# Patient Record
Sex: FEMALE | Employment: OTHER | URBAN - METROPOLITAN AREA
[De-identification: names, ages, dates, MRNs, and addresses within clinical notes are randomized per-mention and may not be internally consistent; named-entity substitution may affect disease eponyms.]

---

## 2024-07-03 ENCOUNTER — APPOINTMENT (OUTPATIENT)
Dept: PRIMARY CARE | Facility: CLINIC | Age: 74
End: 2024-07-03
Payer: MEDICARE

## 2024-07-03 VITALS
HEART RATE: 69 BPM | HEIGHT: 64 IN | TEMPERATURE: 97.3 F | RESPIRATION RATE: 16 BRPM | BODY MASS INDEX: 22.53 KG/M2 | WEIGHT: 132 LBS | SYSTOLIC BLOOD PRESSURE: 141 MMHG | DIASTOLIC BLOOD PRESSURE: 76 MMHG | OXYGEN SATURATION: 95 %

## 2024-07-03 DIAGNOSIS — D64.9 ANEMIA, UNSPECIFIED TYPE: ICD-10-CM

## 2024-07-03 DIAGNOSIS — E03.9 HYPOTHYROIDISM, UNSPECIFIED TYPE: ICD-10-CM

## 2024-07-03 DIAGNOSIS — E53.8 VITAMIN B12 DEFICIENCY: ICD-10-CM

## 2024-07-03 DIAGNOSIS — R41.82 MENTAL STATUS, DECREASED: ICD-10-CM

## 2024-07-03 DIAGNOSIS — Z00.00 ENCOUNTER FOR HEALTH MAINTENANCE EXAMINATION: Primary | ICD-10-CM

## 2024-07-03 DIAGNOSIS — E55.9 VITAMIN D DEFICIENCY: ICD-10-CM

## 2024-07-03 DIAGNOSIS — F03.C0 SEVERE DEMENTIA WITHOUT BEHAVIORAL DISTURBANCE, PSYCHOTIC DISTURBANCE, MOOD DISTURBANCE, OR ANXIETY, UNSPECIFIED DEMENTIA TYPE (MULTI): ICD-10-CM

## 2024-07-03 RX ORDER — LEVOTHYROXINE SODIUM 75 UG/1
75 TABLET ORAL DAILY
Qty: 30 TABLET | Refills: 11 | Status: SHIPPED | OUTPATIENT
Start: 2024-07-03 | End: 2025-07-03

## 2024-07-03 RX ORDER — LEVOTHYROXINE SODIUM 25 UG/1
25 TABLET ORAL
COMMUNITY
Start: 2024-06-14

## 2024-07-03 ASSESSMENT — ACTIVITIES OF DAILY LIVING (ADL)
MANAGING_FINANCES: TOTAL CARE
GROCERY_SHOPPING: NEEDS ASSISTANCE
TAKING_MEDICATION: NEEDS ASSISTANCE
BATHING: DEPENDENT
DOING_HOUSEWORK: NEEDS ASSISTANCE
DRESSING: DEPENDENT

## 2024-07-03 ASSESSMENT — ENCOUNTER SYMPTOMS
LOSS OF SENSATION IN FEET: 1
DEPRESSION: 0
OCCASIONAL FEELINGS OF UNSTEADINESS: 1

## 2024-07-03 ASSESSMENT — PATIENT HEALTH QUESTIONNAIRE - PHQ9
1. LITTLE INTEREST OR PLEASURE IN DOING THINGS: NOT AT ALL
SUM OF ALL RESPONSES TO PHQ9 QUESTIONS 1 AND 2: 0
2. FEELING DOWN, DEPRESSED OR HOPELESS: NOT AT ALL

## 2024-07-03 NOTE — PROGRESS NOTES
"Subjective   Reason for Visit: Kimmy Julian is an 74 y.o. female here for a Medicare Wellness visit.     Reviewed all medications by prescribing practitioner or clinical pharmacist (such as prescriptions, OTCs, herbal therapies and supplements) and documented in the medical record.    HPI  Patient comes in today with her granddaughter Selene who is now staying with her.  She was in the hospital recently at [facility] for change in mental status.  According to the granddaughter the only thing that they found in the hospital was her thyroid level was quite high at 78.93.  In review of her hospital results her CPK was also quite high at 1385 and she was slightly anemic with hemoglobin at 11.7.  Her UA was loaded with bacteria but no blood or nitrites.  Her blood culture came back negative.  Her CT scan of the brain showed no acute abnormality with moderate chronic small vessel ischemic changes notified in the cerebral white matter and along with mild to moderate atrophy.  She also has noted left-sided craniotomy changes.  The MRI of her brain was also unremarkable for any acute changes.    In the office here today mentally she is quite different than I have ever seen her.  She has not been in to see me in 4 years.  A Mini-Mental status exam was done today and she scored a 4 out of 30.  She is quite confused.  She believes she is in \"Dr. Yanez's office\".  Her granddaughter said she was quite excited about coming to see Dr. Walden and she appears to recognize me but does not know my name.  When I had been seeing her previously her thyroid was definitely a problem and we were never able to regulate it appropriately.  Now that her granddaughter lives with her hopefully this will change.  Granddaughter states she is with her all the time and that she does own her own nail salon and when she is at work her mother comes and stays with the patient.  Patient has been on [place] [facility] before.  We are going to maintain her on " levothyroxine this time for management.    1019/2020  LOGAN ASHTON presents with complaints of visit for: (follow up on her meds and order bw)   Patient comes in today with her son Stiven to follow-up on multiple health issues. She apparently is not taking her thyroid medicine again. She lives alone and suffers from some sort of dementia and according to her son he lives nearby and is trying to help her keep track of her medicines. She reports a lot of different symptoms such as weight gain, constipation, muscle aches, all consistent with being hypothyroid. She has gained 18 pounds since last November.    2/11/2020  Patient comes in today with her son will follow-up on her multiple health issues. Patient lives alone and is mildly confused which her son recognizes. She has not been taking her thyroid medicine as directed and was supposed to be here today for follow-up to see if the dose was correct. However the patient states she was told not to take anymore thyroid medication and thus she has not been taking it for the last 3 months. Interestingly she is supposed to be on 90 mg of [place] Thyroid daily and she brings in medication bottles that have 120 mg and 60 mg from years ago. The patient's son assures me that he will try to get her medications straightened out for her. Patient is markedly underweight but she has gained 7 pounds since she was here in November. Unfortunately I suspect that it is from her not taking her thyroid medicine.    11/22/19  Patient comes in today by herself for follow-up on her medications. She is not a good historian and having suffered a significant head injury back in March does not help her memory process. She brings with her urine today that she brought from home because she's been having some urinary symptoms. She has also lost 4 pounds since she was here in August and she is already quite thin.    She has a fungus under the distal right great toenail.    She has a cough and  "congestion. She is allergic to Cipro and penicillin.    8/28/19  Patient comes in today brought in by her son, for recheck and refill of medications. She has not been here since August 2016. She was in the hospital earlier this year after she fell and had a significant head injury with stitches to the left side of the scalp. Patient has significant hypothyroidism and was not taking her medications. She had been placed on her medications on discharge several months ago but does medicines only lasted a few weeks when she ran out. She comes in today currently on no medications. In the past she has always used [place] [facility] as her drug of choice for her thyroid. She would like to get back on that medication. She has lost a significant amount of weight and is currently 5 foot 4 inches, 110 pounds. Patient is basically a recluse and does not get out of her house much at all. She is showered and clean today, just extremely thin.    8/31/16  Patient comes in today for a checkup and refill of medicines. She has not been here since November 2014. She is basically housebound. She claims that she ran out of all of her medicines \"months ago\". She had been the sole caretaker of her 82-year-old  who had a stroke many years ago. He passed away in May 2015. Patient claims that she does not drive and so she has not been able to get in here for her own health care. She claims her son had to take off of work to bring her in here today. She has been feeling \"cold\", losing hair on her legs, her hair is falling out, she feels sluggish and she has had loss of weight.     Patient Care Team:  Chay Walden DO as PCP - General (Family Medicine)     Review of Systems   Psychiatric/Behavioral:  Positive for confusion.    All other systems reviewed and are negative.    Objective   Vitals:  /76   Pulse 69   Temp 36.3 °C (97.3 °F)   Resp 16   Ht 1.626 m (5' 4\")   Wt 59.9 kg (132 lb)   LMP  (LMP Unknown)   SpO2 95%   BMI " 22.66 kg/m²       Physical Exam  Vitals reviewed.   Constitutional:       Appearance: She is well-developed.      Comments: She is currently in a wheelchair but her granddaughter states she is able to get up and move about the house on her own.   HENT:      Head: Normocephalic and atraumatic.      Right Ear: Tympanic membrane, ear canal and external ear normal.      Left Ear: Tympanic membrane, ear canal and external ear normal.      Nose: Nose normal.      Mouth/Throat:      Mouth: Mucous membranes are moist.      Pharynx: Oropharynx is clear.   Eyes:      Extraocular Movements: Extraocular movements intact.      Conjunctiva/sclera: Conjunctivae normal.      Pupils: Pupils are equal, round, and reactive to light.   Cardiovascular:      Rate and Rhythm: Normal rate and regular rhythm.      Heart sounds: Normal heart sounds. No murmur heard.  Pulmonary:      Effort: Pulmonary effort is normal.      Breath sounds: Normal breath sounds. No wheezing.   Abdominal:      General: Abdomen is flat. Bowel sounds are normal.      Palpations: Abdomen is soft.   Musculoskeletal:         General: Normal range of motion.   Skin:     General: Skin is warm and dry.   Neurological:      General: No focal deficit present.      Mental Status: She is alert and oriented to person, place, and time. Mental status is at baseline.   Psychiatric:         Mood and Affect: Mood normal.         Behavior: Behavior normal.         Thought Content: Thought content normal.         Judgment: Judgment normal.         Assessment/Plan   Problem List Items Addressed This Visit       Hypothyroidism    Relevant Medications    levothyroxine (Synthroid, Levoxyl) 75 mcg tablet    Mental status, decreased    Anemia    Severe dementia without behavioral disturbance, psychotic disturbance, mood disturbance, or anxiety (Multi)    Vitamin B12 deficiency    Vitamin D deficiency     Other Visit Diagnoses       Encounter for health maintenance examination    -   Primary        Will investigate further the hospital medical records to see all that has been done.  Investigate past medical records.  Increase levothyroxine to 75 mcg daily.  Continue all current meds.  RTC in one month for recheck, sooner should problems arise.  Medication list reconciled.  Thank you for visiting today!      Professional services: Some of this note was completed using Dragon voice technology and sometimes the software misinterprets words. This may include unintended errors with respect to translation of words, typographical errors or grammar errors which may not have been identified prior to finalization of the chart note. Please take this into account when reading the note. Thank you.

## 2024-07-06 PROBLEM — D64.9 ANEMIA: Status: ACTIVE | Noted: 2024-07-06

## 2024-07-06 PROBLEM — E53.8 VITAMIN B12 DEFICIENCY: Status: ACTIVE | Noted: 2024-07-06

## 2024-07-06 PROBLEM — E03.9 HYPOTHYROIDISM: Status: ACTIVE | Noted: 2024-07-06

## 2024-07-06 PROBLEM — R41.82 MENTAL STATUS, DECREASED: Status: ACTIVE | Noted: 2024-07-06

## 2024-07-06 PROBLEM — F03.C0 SEVERE DEMENTIA WITHOUT BEHAVIORAL DISTURBANCE, PSYCHOTIC DISTURBANCE, MOOD DISTURBANCE, OR ANXIETY (MULTI): Status: ACTIVE | Noted: 2024-07-06

## 2024-07-06 PROBLEM — E55.9 VITAMIN D DEFICIENCY: Status: ACTIVE | Noted: 2024-07-06

## 2024-07-06 ASSESSMENT — ENCOUNTER SYMPTOMS: CONFUSION: 1

## 2024-08-14 ENCOUNTER — APPOINTMENT (OUTPATIENT)
Dept: PRIMARY CARE | Facility: CLINIC | Age: 74
End: 2024-08-14
Payer: MEDICARE

## 2024-08-14 VITALS
TEMPERATURE: 97.5 F | OXYGEN SATURATION: 96 % | DIASTOLIC BLOOD PRESSURE: 90 MMHG | BODY MASS INDEX: 23.34 KG/M2 | WEIGHT: 136 LBS | RESPIRATION RATE: 20 BRPM | HEART RATE: 72 BPM | SYSTOLIC BLOOD PRESSURE: 153 MMHG

## 2024-08-14 DIAGNOSIS — E03.8 OTHER SPECIFIED HYPOTHYROIDISM: ICD-10-CM

## 2024-08-14 DIAGNOSIS — E78.5 HYPERLIPIDEMIA, UNSPECIFIED HYPERLIPIDEMIA TYPE: ICD-10-CM

## 2024-08-14 DIAGNOSIS — E53.8 VITAMIN B12 DEFICIENCY: ICD-10-CM

## 2024-08-14 DIAGNOSIS — F02.80 ALZHEIMER DISEASE (MULTI): ICD-10-CM

## 2024-08-14 DIAGNOSIS — E55.9 VITAMIN D DEFICIENCY: ICD-10-CM

## 2024-08-14 DIAGNOSIS — B07.9 VERRUCAE VULGARIS: ICD-10-CM

## 2024-08-14 DIAGNOSIS — H61.23 BILATERAL HEARING LOSS DUE TO CERUMEN IMPACTION: ICD-10-CM

## 2024-08-14 DIAGNOSIS — F03.C0 SEVERE DEMENTIA WITHOUT BEHAVIORAL DISTURBANCE, PSYCHOTIC DISTURBANCE, MOOD DISTURBANCE, OR ANXIETY, UNSPECIFIED DEMENTIA TYPE (MULTI): Primary | ICD-10-CM

## 2024-08-14 DIAGNOSIS — D50.8 OTHER IRON DEFICIENCY ANEMIA: ICD-10-CM

## 2024-08-14 DIAGNOSIS — G30.9 ALZHEIMER DISEASE (MULTI): ICD-10-CM

## 2024-08-14 PROCEDURE — 69210 REMOVE IMPACTED EAR WAX UNI: CPT | Performed by: FAMILY MEDICINE

## 2024-08-14 PROCEDURE — 99214 OFFICE O/P EST MOD 30 MIN: CPT | Performed by: FAMILY MEDICINE

## 2024-08-14 NOTE — PROGRESS NOTES
Subjective   Patient ID: Kimmy Julian is a 74 y.o. female who presents for Follow-up (Fu for thyroid medication. ).  HPI  Patient returns today with her daughter-in-law for follow-up.  She is back on the Synthroid medication and is much more alert today but still quite confused.  According to her daughter-in-law she has been having problems hearing.    Review of Systems   All other systems reviewed and are negative.      Objective   Vitals:  /90   Pulse 72   Temp 36.4 °C (97.5 °F)   Resp 20   Wt 61.7 kg (136 lb)   LMP  (LMP Unknown)   SpO2 96%   BMI 23.34 kg/m²     Physical Exam  Vitals reviewed.   Constitutional:       Appearance: She is well-developed.   HENT:      Head: Normocephalic and atraumatic.      Right Ear: Tympanic membrane and external ear normal. There is impacted cerumen (Cerumen removed from right ear with irrigation and instrumentation.  Patient tolerated procedure well.  After the cerumen was removed the ear was rechecked and no evidence of infection was found.).      Left Ear: Tympanic membrane and external ear normal. There is impacted cerumen (Cerumen removed from left ear with irrigation and instrumentation.  Patient tolerated procedure well.  After the cerumen was removed the ear was rechecked and no evidence of infection was found.).      Nose: Nose normal.      Mouth/Throat:      Mouth: Mucous membranes are moist.      Pharynx: Oropharynx is clear.   Eyes:      Extraocular Movements: Extraocular movements intact.      Conjunctiva/sclera: Conjunctivae normal.      Pupils: Pupils are equal, round, and reactive to light.   Cardiovascular:      Rate and Rhythm: Normal rate and regular rhythm.      Heart sounds: Normal heart sounds. No murmur heard.  Pulmonary:      Effort: Pulmonary effort is normal.      Breath sounds: Normal breath sounds. No wheezing.   Abdominal:      General: Abdomen is flat. Bowel sounds are normal.      Palpations: Abdomen is soft.   Musculoskeletal:          General: Normal range of motion.   Skin:     General: Skin is warm and dry.   Neurological:      General: No focal deficit present.      Mental Status: She is alert and oriented to person, place, and time. Mental status is at baseline.   Psychiatric:      Comments: Patient is pleasantly confused       Assessment/Plan   Problem List Items Addressed This Visit       Hypothyroidism    Relevant Orders    TSH with reflex to Free T4 if abnormal    Anemia    Relevant Orders    CBC    Severe dementia without behavioral disturbance, psychotic disturbance, mood disturbance, or anxiety (Multi) - Primary    Relevant Orders    Comprehensive Metabolic Panel    Vitamin B12 deficiency    Relevant Orders    CBC    Vitamin B12    Vitamin D deficiency    Relevant Orders    Vitamin D 25-Hydroxy,Total (for eval of Vitamin D levels)     Other Visit Diagnoses       Late onset Alzheimer dementia, unspecified dementia severity, unspecified whether behavioral, psychotic, or mood disturbance or anxiety (Multi)        Alzheimer disease (Multi)        Verrucae vulgaris        Relevant Orders    Referral to Dermatology    Hyperlipidemia, unspecified hyperlipidemia type        Relevant Orders    Lipid Panel        Repeat lab work in September.  Continue current meds as directed.  Follow up in 3 months for recheck if all remains stable, sooner if problems arise.  Medication list reconciled.  Thank you for visiting today!      Professional services: Some of this note was completed using Dragon voice technology and sometimes the software misinterprets words. This may include unintended errors with respect to translation of words, typographical errors or grammar errors which may not have been identified prior to finalization of the chart note. Please take this into account when reading the note. Thank you.       Chay Walden DO 08/15/24 8:07 AM

## 2024-08-15 PROBLEM — B07.9 VERRUCAE VULGARIS: Status: ACTIVE | Noted: 2024-08-15

## 2024-08-15 PROBLEM — E78.5 HYPERLIPIDEMIA: Status: ACTIVE | Noted: 2024-08-15

## 2024-08-15 PROBLEM — H61.23 BILATERAL HEARING LOSS DUE TO CERUMEN IMPACTION: Status: ACTIVE | Noted: 2024-08-15

## 2024-09-03 ENCOUNTER — TELEPHONE (OUTPATIENT)
Dept: PRIMARY CARE | Facility: CLINIC | Age: 74
End: 2024-09-03
Payer: MEDICARE

## 2024-09-03 NOTE — TELEPHONE ENCOUNTER
Patients daughter in law Haleigh called in asking if it is okay for the patient to take Ibuprofen with the medication she is on?  Haleigh states that the patient has complained of a headache for two days.  Haleigh can be reached at [phone].        Thank You

## 2024-09-03 NOTE — TELEPHONE ENCOUNTER
Yes, that is okay for her to take ibuprofen with the medication she is on.  She can take up to 800 mg 3 times a day with food if needed.  Thank you.

## 2024-09-16 ENCOUNTER — LAB (OUTPATIENT)
Dept: LAB | Facility: LAB | Age: 74
End: 2024-09-16
Payer: MEDICARE

## 2024-09-16 ENCOUNTER — TELEPHONE (OUTPATIENT)
Dept: PRIMARY CARE | Facility: CLINIC | Age: 74
End: 2024-09-16

## 2024-09-16 DIAGNOSIS — E03.9 HYPOTHYROIDISM, UNSPECIFIED TYPE: Primary | ICD-10-CM

## 2024-09-16 DIAGNOSIS — D50.8 OTHER IRON DEFICIENCY ANEMIA: ICD-10-CM

## 2024-09-16 DIAGNOSIS — E55.9 VITAMIN D DEFICIENCY: ICD-10-CM

## 2024-09-16 DIAGNOSIS — E53.8 VITAMIN B12 DEFICIENCY: ICD-10-CM

## 2024-09-16 DIAGNOSIS — E78.5 HYPERLIPIDEMIA, UNSPECIFIED HYPERLIPIDEMIA TYPE: ICD-10-CM

## 2024-09-16 DIAGNOSIS — E03.8 OTHER SPECIFIED HYPOTHYROIDISM: ICD-10-CM

## 2024-09-16 DIAGNOSIS — F03.C0 SEVERE DEMENTIA WITHOUT BEHAVIORAL DISTURBANCE, PSYCHOTIC DISTURBANCE, MOOD DISTURBANCE, OR ANXIETY, UNSPECIFIED DEMENTIA TYPE: ICD-10-CM

## 2024-09-16 LAB
25(OH)D3 SERPL-MCNC: 24 NG/ML (ref 30–100)
ALBUMIN SERPL BCP-MCNC: 4.2 G/DL (ref 3.4–5)
ALP SERPL-CCNC: 64 U/L (ref 33–136)
ALT SERPL W P-5'-P-CCNC: 10 U/L (ref 7–45)
ANION GAP SERPL CALC-SCNC: 9 MMOL/L (ref 10–20)
AST SERPL W P-5'-P-CCNC: 16 U/L (ref 9–39)
BILIRUB SERPL-MCNC: 0.3 MG/DL (ref 0–1.2)
BUN SERPL-MCNC: 20 MG/DL (ref 6–23)
CALCIUM SERPL-MCNC: 9.6 MG/DL (ref 8.6–10.3)
CHLORIDE SERPL-SCNC: 107 MMOL/L (ref 98–107)
CHOLEST SERPL-MCNC: 192 MG/DL (ref 0–199)
CHOLESTEROL/HDL RATIO: 3.5
CO2 SERPL-SCNC: 30 MMOL/L (ref 21–32)
CREAT SERPL-MCNC: 1.1 MG/DL (ref 0.5–1.05)
EGFRCR SERPLBLD CKD-EPI 2021: 52 ML/MIN/1.73M*2
ERYTHROCYTE [DISTWIDTH] IN BLOOD BY AUTOMATED COUNT: 13.2 % (ref 11.5–14.5)
GLUCOSE SERPL-MCNC: 73 MG/DL (ref 74–99)
HCT VFR BLD AUTO: 44.2 % (ref 36–46)
HDLC SERPL-MCNC: 54.7 MG/DL
HGB BLD-MCNC: 13.8 G/DL (ref 12–16)
LDLC SERPL CALC-MCNC: 110 MG/DL
MCH RBC QN AUTO: 29.4 PG (ref 26–34)
MCHC RBC AUTO-ENTMCNC: 31.2 G/DL (ref 32–36)
MCV RBC AUTO: 94 FL (ref 80–100)
NON HDL CHOLESTEROL: 137 MG/DL (ref 0–149)
NRBC BLD-RTO: 0 /100 WBCS (ref 0–0)
PLATELET # BLD AUTO: 269 X10*3/UL (ref 150–450)
POTASSIUM SERPL-SCNC: 4.1 MMOL/L (ref 3.5–5.3)
PROT SERPL-MCNC: 7.1 G/DL (ref 6.4–8.2)
RBC # BLD AUTO: 4.7 X10*6/UL (ref 4–5.2)
SODIUM SERPL-SCNC: 142 MMOL/L (ref 136–145)
T4 FREE SERPL-MCNC: 0.96 NG/DL (ref 0.61–1.12)
TRIGL SERPL-MCNC: 135 MG/DL (ref 0–149)
TSH SERPL-ACNC: 25.04 MIU/L (ref 0.44–3.98)
VIT B12 SERPL-MCNC: 286 PG/ML (ref 211–911)
VLDL: 27 MG/DL (ref 0–40)
WBC # BLD AUTO: 6.4 X10*3/UL (ref 4.4–11.3)

## 2024-09-16 PROCEDURE — 82306 VITAMIN D 25 HYDROXY: CPT

## 2024-09-16 PROCEDURE — 82607 VITAMIN B-12: CPT

## 2024-09-16 PROCEDURE — 80061 LIPID PANEL: CPT

## 2024-09-16 PROCEDURE — 84439 ASSAY OF FREE THYROXINE: CPT

## 2024-09-16 PROCEDURE — 84443 ASSAY THYROID STIM HORMONE: CPT

## 2024-09-16 PROCEDURE — 80053 COMPREHEN METABOLIC PANEL: CPT

## 2024-09-16 PROCEDURE — 85027 COMPLETE CBC AUTOMATED: CPT

## 2024-09-16 PROCEDURE — 36415 COLL VENOUS BLD VENIPUNCTURE: CPT

## 2024-09-16 RX ORDER — LEVOTHYROXINE SODIUM 100 UG/1
100 TABLET ORAL DAILY
Qty: 90 TABLET | Refills: 1 | Status: SHIPPED | OUTPATIENT
Start: 2024-09-16 | End: 2025-03-15

## 2024-09-16 NOTE — RESULT ENCOUNTER NOTE
Please notify patient that her vitamin D level was low at 24.  It should be between 30 and 100 the closer to 50 the better. It is an important vitamin for your heart, lungs, immune system and bones among other things in your body. Would recommend over the counter vitamin D3 2000 units daily to get it into and keep it in the normal range and recheck in January 2025.     Her thyroid level is still very underactive.  We need to increase her levothyroxine to 100 mcg daily and we will need to recheck this again in January 2025.      Her B12 level was very low as well at 286.  It should be above 400, when less than 400 one may have both neurological and/or psychological symptoms. Would recommend B-12 shots monthly for 4 months and recheck in January 2025.  These can be self injected at home or set up in our office as a nurse visit. Let me know which she would like to do.

## 2024-09-16 NOTE — TELEPHONE ENCOUNTER
----- Message from Chay Walden sent at 9/16/2024  1:19 PM EDT -----  Please notify patient that her vitamin D level was low at 24.  It should be between 30 and 100 the closer to 50 the better. It is an important vitamin for your heart, lungs, immune system and bones among other things in your body. Would recommend over the counter vitamin D3 2000 units daily to get it into and keep it in the normal range and recheck in January 2025.     Her thyroid level is still very underactive.  We need to increase her levothyroxine to 100 mcg daily and we will need to recheck this again in January 2025.      Her B12 level was very low as well at 286.  It should be above 400, when less than 400 one may have both neurological and/or psychological symptoms. Would recommend B-12 shots monthly for 4 months and recheck in January 2025.  These can be self injected at home or set up in our office as a nurse visit. Let me know which she would like to do.

## 2024-09-17 ENCOUNTER — TELEPHONE (OUTPATIENT)
Dept: PRIMARY CARE | Facility: CLINIC | Age: 74
End: 2024-09-17

## 2024-10-18 ENCOUNTER — APPOINTMENT (OUTPATIENT)
Dept: PRIMARY CARE | Facility: CLINIC | Age: 74
End: 2024-10-18
Payer: MEDICARE

## 2024-10-18 DIAGNOSIS — E53.8 VITAMIN B12 DEFICIENCY: ICD-10-CM

## 2024-10-18 PROCEDURE — 96372 THER/PROPH/DIAG INJ SC/IM: CPT | Performed by: FAMILY MEDICINE

## 2024-10-18 RX ORDER — CYANOCOBALAMIN 1000 UG/ML
1000 INJECTION, SOLUTION INTRAMUSCULAR; SUBCUTANEOUS
Status: SHIPPED | OUTPATIENT
Start: 2024-10-18 | End: 2025-02-15

## 2024-10-18 NOTE — PROGRESS NOTES
Kimmy Julian presented in office for 1/4 b12 inj nurse visit. Pt tolerated well, no side effects. Overseeing provider was Dr. Walden  .

## 2024-10-21 ENCOUNTER — TELEPHONE (OUTPATIENT)
Dept: PRIMARY CARE | Facility: CLINIC | Age: 74
End: 2024-10-21
Payer: MEDICARE

## 2024-11-18 ENCOUNTER — APPOINTMENT (OUTPATIENT)
Dept: PRIMARY CARE | Facility: CLINIC | Age: 74
End: 2024-11-18
Payer: MEDICARE

## 2024-12-05 ENCOUNTER — CLINICAL SUPPORT (OUTPATIENT)
Dept: PRIMARY CARE | Facility: CLINIC | Age: 74
End: 2024-12-05
Payer: MEDICARE

## 2024-12-05 DIAGNOSIS — E53.8 VITAMIN B12 DEFICIENCY: ICD-10-CM

## 2024-12-05 PROCEDURE — 96372 THER/PROPH/DIAG INJ SC/IM: CPT | Performed by: FAMILY MEDICINE

## 2024-12-06 RX ORDER — CYANOCOBALAMIN 1000 UG/ML
1000 INJECTION, SOLUTION INTRAMUSCULAR; SUBCUTANEOUS
Status: SHIPPED | OUTPATIENT
Start: 2024-12-07 | End: 2025-02-05

## 2024-12-06 NOTE — PROGRESS NOTES
Kimmy Julian presented in office for 2/4 b12 inj and patient teaching nurse visit. Pt tolerated well, no side effects. Overseeing provider was Dr. Walden    Pt daughter will be doing remaining 2 injections.

## 2025-01-13 ENCOUNTER — TELEPHONE (OUTPATIENT)
Dept: PRIMARY CARE | Facility: CLINIC | Age: 75
End: 2025-01-13
Payer: MEDICARE

## 2025-01-13 DIAGNOSIS — E53.8 VITAMIN B12 DEFICIENCY: Primary | ICD-10-CM

## 2025-01-13 RX ORDER — CYANOCOBALAMIN 1000 UG/ML
1000 INJECTION, SOLUTION INTRAMUSCULAR; SUBCUTANEOUS
Qty: 3 ML | Refills: 0 | Status: SHIPPED | OUTPATIENT
Start: 2025-01-13

## 2025-01-13 NOTE — TELEPHONE ENCOUNTER
Patients daughter is calling in stating that the pharmacy never received the B-12 prescription that was sent in December pharmacy is stating that they did not receive it.          Thank You

## 2025-01-13 NOTE — TELEPHONE ENCOUNTER
Please notify patient's daughter that B12 injections were sent to [facility] in [place].  Thank you

## 2025-02-17 ENCOUNTER — APPOINTMENT (OUTPATIENT)
Dept: PRIMARY CARE | Facility: CLINIC | Age: 75
End: 2025-02-17
Payer: MEDICARE

## 2025-03-04 LAB
25(OH)D3+25(OH)D2 SERPL-MCNC: 40 NG/ML (ref 30–100)
ALBUMIN SERPL-MCNC: 4.1 G/DL (ref 3.6–5.1)
ALBUMIN/GLOB SERPL: 1.5 (CALC) (ref 1–2.5)
ALP SERPL-CCNC: 68 U/L (ref 37–153)
ALT SERPL-CCNC: 10 U/L (ref 6–29)
AST SERPL-CCNC: 16 U/L (ref 10–35)
BILIRUB SERPL-MCNC: 0.5 MG/DL (ref 0.2–1.2)
BUN SERPL-MCNC: 20 MG/DL (ref 7–25)
BUN/CREAT SERPL: 20 (CALC) (ref 6–22)
CALCIUM SERPL-MCNC: 9.8 MG/DL (ref 8.6–10.4)
CHLORIDE SERPL-SCNC: 104 MMOL/L (ref 98–110)
CHOLEST SERPL-MCNC: 225 MG/DL
CHOLEST/HDLC SERPL: 4.6 (CALC)
CO2 SERPL-SCNC: 28 MMOL/L (ref 20–32)
CREAT SERPL-MCNC: 1.02 MG/DL (ref 0.6–1)
EGFRCR SERPLBLD CKD-EPI 2021: 57 ML/MIN/1.73M2
ERYTHROCYTE [DISTWIDTH] IN BLOOD BY AUTOMATED COUNT: 13.8 % (ref 11–15)
GLOBULIN SER CALC-MCNC: 2.8 G/DL (CALC) (ref 1.9–3.7)
GLUCOSE SERPL-MCNC: 99 MG/DL (ref 65–99)
HCT VFR BLD AUTO: 45.2 % (ref 35–45)
HDLC SERPL-MCNC: 49 MG/DL
HGB BLD-MCNC: 14.5 G/DL (ref 11.7–15.5)
LDLC SERPL CALC-MCNC: 147 MG/DL (CALC)
MCH RBC QN AUTO: 27.5 PG (ref 27–33)
MCHC RBC AUTO-ENTMCNC: 32.1 G/DL (ref 32–36)
MCV RBC AUTO: 85.8 FL (ref 80–100)
NONHDLC SERPL-MCNC: 176 MG/DL (CALC)
PLATELET # BLD AUTO: 289 THOUSAND/UL (ref 140–400)
PMV BLD REES-ECKER: 9.3 FL (ref 7.5–12.5)
POTASSIUM SERPL-SCNC: 4.5 MMOL/L (ref 3.5–5.3)
PROT SERPL-MCNC: 6.9 G/DL (ref 6.1–8.1)
RBC # BLD AUTO: 5.27 MILLION/UL (ref 3.8–5.1)
SODIUM SERPL-SCNC: 140 MMOL/L (ref 135–146)
T4 FREE SERPL-MCNC: 1.5 NG/DL (ref 0.8–1.8)
TRIGL SERPL-MCNC: 158 MG/DL
TSH SERPL-ACNC: 1.21 MIU/L (ref 0.4–4.5)
VIT B12 SERPL-MCNC: 452 PG/ML (ref 200–1100)
WBC # BLD AUTO: 6.6 THOUSAND/UL (ref 3.8–10.8)

## 2025-03-05 NOTE — RESULT ENCOUNTER NOTE
Otoniel Oneal,    Your cholesterol/HDL ratio was high at 4.6, any ratio 5.0 or higher is considered high risk for heart disease and stroke, ratio of 3.4 is ideal. Would recommend closely watching cholesterol and fats in the diet and will recheck in June 2025. If not improved at that time we may need to start medication.    Your kidney function was just slightly off with your creatinine at 1.02.  That should correct by making sure you are drinking at least 24 to 32 ounces of water a day.    Your thyroid level looks good with your TSH in normal range.    Your vitamin levels look good as well.  Continue your current medicines and we will recheck your labs again in a year.    Have a Great Day and Weekend!!!    Dr. Walden

## 2025-03-10 ENCOUNTER — HOSPITAL ENCOUNTER (OUTPATIENT)
Dept: RADIOLOGY | Facility: CLINIC | Age: 75
Discharge: HOME | End: 2025-03-10
Payer: MEDICARE

## 2025-03-10 ENCOUNTER — APPOINTMENT (OUTPATIENT)
Dept: PRIMARY CARE | Facility: CLINIC | Age: 75
End: 2025-03-10
Payer: MEDICARE

## 2025-03-10 VITALS
BODY MASS INDEX: 27.29 KG/M2 | TEMPERATURE: 97.3 F | RESPIRATION RATE: 16 BRPM | SYSTOLIC BLOOD PRESSURE: 138 MMHG | OXYGEN SATURATION: 96 % | WEIGHT: 159 LBS | HEART RATE: 83 BPM | DIASTOLIC BLOOD PRESSURE: 86 MMHG

## 2025-03-10 DIAGNOSIS — M25.551 BILATERAL HIP PAIN: Primary | ICD-10-CM

## 2025-03-10 DIAGNOSIS — R41.82 MENTAL STATUS, DECREASED: ICD-10-CM

## 2025-03-10 DIAGNOSIS — E78.2 HYPERLIPIDEMIA, MIXED: ICD-10-CM

## 2025-03-10 DIAGNOSIS — E03.9 HYPOTHYROIDISM (ACQUIRED): ICD-10-CM

## 2025-03-10 DIAGNOSIS — M25.552 BILATERAL HIP PAIN: ICD-10-CM

## 2025-03-10 DIAGNOSIS — M25.552 BILATERAL HIP PAIN: Primary | ICD-10-CM

## 2025-03-10 DIAGNOSIS — G30.9 ALZHEIMER DISEASE (MULTI): ICD-10-CM

## 2025-03-10 DIAGNOSIS — R63.5 UNINTENDED WEIGHT GAIN: ICD-10-CM

## 2025-03-10 DIAGNOSIS — M25.551 BILATERAL HIP PAIN: ICD-10-CM

## 2025-03-10 DIAGNOSIS — F02.80 ALZHEIMER DISEASE (MULTI): ICD-10-CM

## 2025-03-10 PROCEDURE — 73521 X-RAY EXAM HIPS BI 2 VIEWS: CPT

## 2025-03-10 PROCEDURE — 99214 OFFICE O/P EST MOD 30 MIN: CPT | Performed by: FAMILY MEDICINE

## 2025-03-10 RX ORDER — CHOLECALCIFEROL (VITAMIN D3) 25 MCG
1000 TABLET ORAL DAILY
COMMUNITY

## 2025-03-10 RX ORDER — LORATADINE 10 MG/1
10 TABLET ORAL DAILY
COMMUNITY

## 2025-03-10 ASSESSMENT — PATIENT HEALTH QUESTIONNAIRE - PHQ9
1. LITTLE INTEREST OR PLEASURE IN DOING THINGS: NOT AT ALL
2. FEELING DOWN, DEPRESSED OR HOPELESS: NOT AT ALL
SUM OF ALL RESPONSES TO PHQ9 QUESTIONS 1 AND 2: 0

## 2025-03-10 NOTE — RESULT ENCOUNTER NOTE
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  Use Tylenol 1000 mg 3 times a day or Advil 800 mg 3 times a day with food or Aleve 2 pills twice a day with food as needed for aches and pains.  Follow-up in the office in 2-4 weeks for recheck if symptoms not improved, sooner if condition worsens.    Have a Great Day!!!    Dr. Walden

## 2025-03-10 NOTE — PROGRESS NOTES
"Subjective   Patient ID: Kimmy Julian is a 74 y.o. female who presents for Follow-up (6 mo med fu. No questions, concerns, or complaints. /) and Request For Order(s) (Request for disability placcard, due to hip pain when walking great distances. ).    HPI  Patient presents today with her granddaughter Selene for 6-month checkup and refill of meds.  She states that she is taking her medicines as directed and is not having any side effects from them. She currently is with complaints of pain in the hips bilaterally.  She claims is painful to walk.  She has gained 23 pounds since she was here in August.  Her granddaughter who lives with her states that she is eating well and \"likes sweets\".    Review of Systems   All other systems reviewed and are negative.      Objective   Vitals:  /86   Pulse 83   Temp 36.3 °C (97.3 °F)   Resp 16   Wt 72.1 kg (159 lb)   LMP  (LMP Unknown)   SpO2 96%   BMI 27.29 kg/m²     Physical Exam  Vitals reviewed.   Constitutional:       Appearance: She is well-developed.   HENT:      Head: Normocephalic and atraumatic.      Right Ear: Tympanic membrane and external ear normal.      Left Ear: Tympanic membrane and external ear normal.      Nose: Nose normal.      Mouth/Throat:      Mouth: Mucous membranes are moist.      Pharynx: Oropharynx is clear.   Eyes:      Extraocular Movements: Extraocular movements intact.      Conjunctiva/sclera: Conjunctivae normal.      Pupils: Pupils are equal, round, and reactive to light.   Cardiovascular:      Rate and Rhythm: Normal rate and regular rhythm.      Heart sounds: Normal heart sounds. No murmur heard.  Pulmonary:      Effort: Pulmonary effort is normal.      Breath sounds: Normal breath sounds. No wheezing.   Abdominal:      General: Abdomen is flat. Bowel sounds are normal.      Palpations: Abdomen is soft.   Musculoskeletal:         General: Tenderness (Bilateral hip pain) present. Normal range of motion.   Skin:     General: Skin " is warm and dry.   Neurological:      General: No focal deficit present.      Mental Status: She is alert and oriented to person, place, and time. Mental status is at baseline.   Psychiatric:      Comments: Patient is pleasantly confused         CBC -  Recent Labs     03/03/25  1040 09/16/24  0940 10/19/20  1210   WBC 6.6 6.4 4.9   HGB 14.5 13.8 12.6   HCT 45.2* 44.2 39.9    269 168   MCV 85.8 94 98       CMP -  Recent Labs     03/03/25  1040 09/16/24  0940 10/19/20  1210 11/22/19  1530 03/02/19  0555 02/28/19  1532 02/25/19  0230 02/24/19  0414 02/23/19  0418 02/22/19  0356    142 141 143   < > 140   < > 140   < > 140   K 4.5 4.1 3.6 4.2   < > 3.5   < > 3.9   < > 3.9    107 102 104   < > 102   < > 103   < > 105   CO2 28 30 29 27   < > 32   < > 30   < > 28   ANIONGAP  --  9* 14 16   < > 10   < > 11   < > 11   BUN 20 20 22 19   < > 11   < > 11   < > 10   CREATININE 1.02* 1.10* 1.14* 0.80   < > 0.57   < > 0.55   < > 0.46*   EGFR 57* 52*  --   --   --   --   --   --   --   --    MG  --   --   --   --   --  2.35  --  2.25  --  2.20    < > = values in this interval not displayed.     Recent Labs     03/03/25  1040 09/16/24  0940 10/19/20  1210   ALBUMIN 4.1 4.2 5.4*   ALKPHOS 68 64 64   ALT 10 10 44   AST 16 16 90*   BILITOT 0.5 0.3 0.6       LIPID PANEL -   Recent Labs     03/03/25  1040 09/16/24  0940 02/14/19  2309 02/14/19  0325   CHOL 225* 192 120 128   LDLCALC 147* 110*  --   --    LDLF  --   --  56 59   HDL 49* 54.7 46.8 53.4   TRIG 158* 135 84 77       Recent Labs     11/22/19  1530   HGBA1C 5.0       Assessment/Plan   Problem List Items Addressed This Visit       Hypothyroidism (acquired)    Mental status, decreased    Alzheimer disease (Multi)    Hyperlipidemia, mixed    Unintended weight gain    Bilateral hip pain - Primary    Relevant Orders    XR hips bilateral 2 VW w pelvis when performed    Follow Up In [facility] - PCP - Established   Will contact patient with x-ray results  when they are available.  Continue current meds as directed.  Follow up in 6 months for recheck if all remains stable, sooner if problems arise.  Medication list reconciled.  Thank you for visiting today!      Professional services: Some of this note was completed using Dragon voice technology and sometimes the software misinterprets words. This may include unintended errors with respect to translation of words, typographical errors or grammar errors which may not have been identified prior to finalization of the chart note. Please take this into account when reading the note. Thank you.       Chay Walden DO 03/10/25 10:26 AM

## 2025-03-11 ENCOUNTER — TELEPHONE (OUTPATIENT)
Dept: PRIMARY CARE | Facility: CLINIC | Age: 75
End: 2025-03-11
Payer: MEDICARE

## 2025-03-11 NOTE — TELEPHONE ENCOUNTER
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  Use Tylenol 1000 mg 3 times a day or Advil 800 mg 3 times a day with food or Aleve 2 pills twice a day with food as needed for aches and pains.  Follow-up in the office in 2-4 weeks for recheck if symptoms not improved, sooner if condition worsens.    Have a Great Day!!!    Dr. Walden

## 2025-03-11 NOTE — TELEPHONE ENCOUNTER
Walking or exercising absolutely would improve this.  Would strongly recommend trying to get 5,000-10,000 steps a day.

## 2025-03-11 NOTE — TELEPHONE ENCOUNTER
Pt's granddaughter was given results and verbalized understanding.   She asks if light walking or exercise might improve or prevent this from worsening. Or if she should be resting. She sits mostly all day.   Please advise.

## 2025-07-10 ENCOUNTER — APPOINTMENT (OUTPATIENT)
Dept: PRIMARY CARE | Facility: CLINIC | Age: 75
End: 2025-07-10
Payer: MEDICARE

## 2025-08-04 ENCOUNTER — TELEPHONE (OUTPATIENT)
Dept: PRIMARY CARE | Facility: CLINIC | Age: 75
End: 2025-08-04
Payer: MEDICARE

## 2025-08-04 DIAGNOSIS — E03.9 HYPOTHYROIDISM, UNSPECIFIED TYPE: ICD-10-CM

## 2025-08-04 RX ORDER — LEVOTHYROXINE SODIUM 100 UG/1
100 TABLET ORAL DAILY
Qty: 90 TABLET | Refills: 0 | Status: SHIPPED | OUTPATIENT
Start: 2025-08-04 | End: 2025-11-02

## 2025-08-04 NOTE — TELEPHONE ENCOUNTER
Rx Refill Request Telephone Encounter    Name:  Kimmy Julian  :  [id number]  Medication Name:  levothyroxine (Synthroid, Levoxyl) 100 mcg tablet       Specific Pharmacy location:    [address] [place] Phone: [phone]   Fax: [phone]        Date of last appointment:  03/10/25  Date of next appointment:  09/10/25  Best number to reach patient:  [phone]      Thank You

## 2025-09-10 ENCOUNTER — APPOINTMENT (OUTPATIENT)
Dept: PRIMARY CARE | Facility: CLINIC | Age: 75
End: 2025-09-10
Payer: MEDICARE